# Patient Record
Sex: MALE | Race: OTHER
[De-identification: names, ages, dates, MRNs, and addresses within clinical notes are randomized per-mention and may not be internally consistent; named-entity substitution may affect disease eponyms.]

---

## 2017-06-29 ENCOUNTER — HOSPITAL ENCOUNTER (INPATIENT)
Dept: HOSPITAL 12 - SRC | Age: 27
LOS: 6 days | Discharge: HOME | DRG: 895 | End: 2017-07-05
Attending: INTERNAL MEDICINE | Admitting: INTERNAL MEDICINE
Payer: COMMERCIAL

## 2017-06-29 VITALS — WEIGHT: 160 LBS | HEIGHT: 68 IN | BODY MASS INDEX: 24.25 KG/M2

## 2017-06-29 VITALS — SYSTOLIC BLOOD PRESSURE: 119 MMHG | DIASTOLIC BLOOD PRESSURE: 68 MMHG

## 2017-06-29 VITALS — SYSTOLIC BLOOD PRESSURE: 116 MMHG | DIASTOLIC BLOOD PRESSURE: 57 MMHG

## 2017-06-29 VITALS — DIASTOLIC BLOOD PRESSURE: 47 MMHG | SYSTOLIC BLOOD PRESSURE: 100 MMHG

## 2017-06-29 DIAGNOSIS — F17.210: ICD-10-CM

## 2017-06-29 DIAGNOSIS — F11.23: Primary | ICD-10-CM

## 2017-06-29 DIAGNOSIS — F13.90: ICD-10-CM

## 2017-06-29 LAB
ALP SERPL-CCNC: 70 U/L (ref 50–136)
ALT SERPL W/O P-5'-P-CCNC: 20 U/L (ref 16–63)
AMPHETAMINES UR QL SCN>1000 NG/ML: NEGATIVE
AST SERPL-CCNC: 13 U/L (ref 15–37)
BARBITURATES UR QL SCN: NEGATIVE
BASOPHILS # BLD AUTO: 0.1 K/UL (ref 0–8)
BASOPHILS NFR BLD AUTO: 1.1 % (ref 0–2)
BILIRUB SERPL-MCNC: 0.3 MG/DL (ref 0.2–1)
BUN SERPL-MCNC: 9 MG/DL (ref 7–18)
CHLORIDE SERPL-SCNC: 106 MMOL/L (ref 98–107)
CO2 SERPL-SCNC: 26 MMOL/L (ref 21–32)
COCAINE UR QL SCN: NEGATIVE
CREAT SERPL-MCNC: 1 MG/DL (ref 0.6–1.3)
EOSINOPHIL # BLD AUTO: 0 K/UL (ref 0–0.7)
EOSINOPHIL NFR BLD AUTO: 0.2 % (ref 0–7)
ETHANOL SERPL-MCNC: < 3 MG/DL (ref 0–0)
GLUCOSE SERPL-MCNC: 97 MG/DL (ref 74–106)
HCT VFR BLD AUTO: 45.8 % (ref 40–50)
HGB BLD-MCNC: 15.6 G/DL (ref 14–18)
LYMPHOCYTES # BLD AUTO: 1.3 K/UL (ref 0.8–4.8)
LYMPHOCYTES NFR BLD AUTO: 14.4 % (ref 20.5–51.5)
MAGNESIUM SERPL-MCNC: 2.1 MG/DL (ref 1.8–2.4)
MCH RBC QN AUTO: 31.4 UUG (ref 27–31)
MCHC RBC AUTO-ENTMCNC: 34 G/DL (ref 32–37)
MCV RBC AUTO: 92.2 FL (ref 82–92)
MONOCYTES # BLD AUTO: 0.6 K/UL (ref 0.1–1.3)
MONOCYTES NFR BLD AUTO: 6.5 % (ref 0–11)
NEUTROPHILS # BLD AUTO: 7.2 K/UL (ref 1.8–8.9)
NEUTROPHILS NFR BLD AUTO: 77.8 % (ref 38.5–71.5)
OPIATES UR QL SCN: POSITIVE
PCP UR QL SCN>25 NG/ML: NEGATIVE
PLATELET # BLD AUTO: 278 K/UL (ref 150–450)
POTASSIUM SERPL-SCNC: 4.1 MMOL/L (ref 3.5–5.1)
RBC # BLD AUTO: 4.96 MIL/UL (ref 4.7–6.1)
THC UR QL SCN>50 NG/ML: NEGATIVE
WBC # BLD AUTO: 9.2 K/UL (ref 4–11.2)
WS STN SPEC: 7.8 G/DL (ref 6.4–8.2)

## 2017-06-29 PROCEDURE — HZ2ZZZZ DETOXIFICATION SERVICES FOR SUBSTANCE ABUSE TREATMENT: ICD-10-PCS | Performed by: INTERNAL MEDICINE

## 2017-06-29 PROCEDURE — G0480 DRUG TEST DEF 1-7 CLASSES: HCPCS

## 2017-06-29 RX ADMIN — HYDROXYZINE PAMOATE PRN MG: 25 CAPSULE ORAL at 15:58

## 2017-06-29 RX ADMIN — GABAPENTIN SCH MG: 300 CAPSULE ORAL at 20:58

## 2017-06-29 RX ADMIN — HYDROXYZINE PAMOATE PRN MG: 25 CAPSULE ORAL at 22:24

## 2017-06-29 NOTE — NUR
PRN ROBAXIN, IBUPROFEN AND VISTARIL



Patient complains of body aches 8/10 and anxiety, prn robaxin, ibuprofen and vistaril given. 
 Will continue to monitor patient. 

-------------------------------------------------------------------------------

Addendum: 06/29/17 at 1852 by TIFFANIE WINSTON RN

-------------------------------------------------------------------------------

this is note for 1558pm.

## 2017-06-29 NOTE — NUR
END OD SHIFT 



Patient is 26yo male admitted for supervised opiate withdrawal. Alert and oriented X4, full 
code, with NKA. Patient reports pain 8/10 body aches. Denies SOB and chest pain. Patient has 
steady gait.  Denies suicidal and homicidal ideation.  PRN robaxin, ibuprofen and vistaril 
given as well as one time gabapentin given. Last COW: 5, complains of anxiety and body 
aches.  Patient tolerated dinner and ate 100% without complaint of n/v. He went out for 
fresh air break with escort.

## 2017-06-29 NOTE — NUR
Start of Shift Note:

Patient is a 28 y/o male admitted on 06/29/17 for Opiate dependence. Patient reported using 
Norco or Roxycodone whichever is available. Patient was using 10 pills daily for 5 months. 
Last use was 2 days ago. Patient denies any past medical history. Patient is on a regular 
diet with no known food and drug allergies. Full Code status. Patient has no taper. PRN 
medications is available for symptoms of withdrawal. Last COWS is 5. Patient was given PRN 
Vistaril, Robaxin & Motrin during day shift. Patient is alert & oriented x4. No shortness of 
breath noted. Respiration even & unlabored. Abdomen soft & non-distended. No nausea/vomiting 
noted. Patient denies sweating, chills or diarrhea. No hand tremors noted. Patient with 
complains of 8/10 body aches & restlessness. Patient appears anxious. Encourage pt to 
increase fluid intake. Safety precautions are in place. Bed locked in lowest position. Both 
side rails up. Call light within pt's reach. Will continue to monitor patient.

## 2017-06-29 NOTE — NUR
PRN ROBAXIN, IBUPROFEN AND VISTARIL



Patient complains of body aches 8/10 and anxiety, prn robaxin, ibuprofen and vistaril given. 
 Will continue to monitor patient.

## 2017-06-29 NOTE — NUR
INTAKE ASSESSMENT



Patient is 28yo female patient presented for admission for supervised withdrawal from 
opiates and heroin. Patient states he was recently taking roxies or norco (whichever is 
available) and heroin.  Denies use of alcohol. Vital signs: 125/57, HR 84, R 18, 02 sat 97% 
RA, pain 8/10. Patient denies chest pain and SOB, complains of anxiety, restless arms and 
legs, body aches 8/10 and diarrhea. [facility] routines explained to patient (i.e q4h vital, 
medication handling including narcotics , disposal of any contraband. Patient has no home 
meds. Patient appears to be stable to proceed with his admission to [facility] 
for further care.

## 2017-06-29 NOTE — NUR
ADMISSION NOTE: 



Patient is 26yo male admitted for supervised opiate withdrawal. Alert and oriented X4, full 
code, with NKa. Patient reports pain 8/10 body aches. Denies SOB and chest pain, has 1 
pimple on his back. Patient has steady gait. Patient denies any medical histpry. No history 
of seizure. Patient received ibuprofen, vistaril and robaxin. Declined loperamide at this 
time but will notify nurse if persists. Patient complains of tingling pain on BUE and BLE 
especially on L arm, MD was notified, one time gabapentin 300mg was given. Denies suicidal 
and homicidal ideation. COW on admission = 10, complains of yawning, nausea, body aches, 
diarrhea. Patient has no PCP at this time.  



Subtance use history per patient report:



1) Roxies - pt. reports last use was 6/25/17, used 10 pills, for 5 months, started 3 years 
ago (pt. reports he would use roxies or norco whichever is available) 



2) Norco - pt. reports last use was 6/27/17, used 10 pills/day, for past 5 months, started 3 
years ago (pt. reports he would use roxies or norco whichever is available)   



3) Heroin - last use was 6/26/17, used 1-2 grams, for 3 weeks, started 1 month ago 

 



Rehab history: none

## 2017-06-29 NOTE — NUR
MD COMMUNICATION/TELEPHONE ORDER



Patient complains of tingling pain on BUE and BLE. Notified Dr. Nolen. Received telephone 
order for gabapentin 300mg TID and ONE time, order read back.

## 2017-06-29 NOTE — NUR
PRN Tylenol & Benadryl

Patient complains of 8/10 body aches and noted to be restless. Patient is moaning and with 
facial grimacing noted. Patient verbalized that he has trouble sleeping d/t pain. PRN 
Tylenol and Benadryl administered as ordered. Will monitor for effectiveness of medication.

## 2017-06-29 NOTE — NUR
One-time Robaxin order



Pt c/o generalized muscle pain=10/10 with episodes of crying. Called Dr. Nolen and he 
ordered one-time Robaxin 750 mg PO. Carried out by Primary Nurse.

## 2017-06-29 NOTE — NUR
Positive benzo results 

Pt states that his friend gave him a "sleeping pill", pt is unable to recall what it was 
called. Pt states that he took one pill the night prior on 6/28/17. Pt denies taking any 
other benzos.

## 2017-06-29 NOTE — NUR
REASSESSMENT PRN ROBAXIN, IBUPROFEN AND VISTARIL



Patient reports feeling less anxious.  Patient also reports the pain level has decreased to 
5/10 but still feels the tingling pain.

## 2017-06-29 NOTE — NUR
PRN Baclofen

Patient is so restless and anxious. Patient complains of 8/10 tingling pain & cramping all 
over body. PRN Baclofen administered as ordered. Will monitor for effectiveness of 
medication.

## 2017-06-29 NOTE — NUR
PRN Subutex

Patient presented with nausea with 1 episode of vomiting, runny nose, moderately dilated 
pupils, anxiet & sever body aches. Patient is pacing inside his room and unable to sit 
still. Vitals WNL. COWS is 17. PRN Subutex 4mg administered as ordered. Will monitor for 
effectiveness of medication.

-------------------------------------------------------------------------------

Addendum: 06/30/17 at 0243 by NAYELI FULTON RN

-------------------------------------------------------------------------------

Patient was also given PRN Trazodone d/t complains of not being able to sleep d/t pain. Will 
monitor for effectiveness of medication.

## 2017-06-30 VITALS — SYSTOLIC BLOOD PRESSURE: 137 MMHG | DIASTOLIC BLOOD PRESSURE: 81 MMHG

## 2017-06-30 VITALS — DIASTOLIC BLOOD PRESSURE: 62 MMHG | SYSTOLIC BLOOD PRESSURE: 126 MMHG

## 2017-06-30 VITALS — DIASTOLIC BLOOD PRESSURE: 60 MMHG | SYSTOLIC BLOOD PRESSURE: 95 MMHG

## 2017-06-30 VITALS — DIASTOLIC BLOOD PRESSURE: 63 MMHG | SYSTOLIC BLOOD PRESSURE: 123 MMHG

## 2017-06-30 VITALS — SYSTOLIC BLOOD PRESSURE: 116 MMHG | DIASTOLIC BLOOD PRESSURE: 88 MMHG

## 2017-06-30 VITALS — SYSTOLIC BLOOD PRESSURE: 127 MMHG | DIASTOLIC BLOOD PRESSURE: 70 MMHG

## 2017-06-30 PROCEDURE — HZ41ZZZ GROUP COUNSELING FOR SUBSTANCE ABUSE TREATMENT, BEHAVIORAL: ICD-10-PCS

## 2017-06-30 PROCEDURE — HZ31ZZZ INDIVIDUAL COUNSELING FOR SUBSTANCE ABUSE TREATMENT, BEHAVIORAL: ICD-10-PCS

## 2017-06-30 RX ADMIN — BUPRENORPHINE HYDROCHLORIDE SCH MG: 2 TABLET SUBLINGUAL at 16:35

## 2017-06-30 RX ADMIN — GABAPENTIN SCH MG: 300 CAPSULE ORAL at 20:54

## 2017-06-30 RX ADMIN — BACLOFEN PRN MG: 20 TABLET ORAL at 13:22

## 2017-06-30 RX ADMIN — GABAPENTIN SCH MG: 300 CAPSULE ORAL at 08:31

## 2017-06-30 RX ADMIN — THERA TABS SCH UDTAB: TAB at 08:31

## 2017-06-30 RX ADMIN — BUPRENORPHINE HYDROCHLORIDE SCH MG: 2 TABLET SUBLINGUAL at 20:54

## 2017-06-30 RX ADMIN — GABAPENTIN SCH MG: 300 CAPSULE ORAL at 14:39

## 2017-06-30 RX ADMIN — BUPRENORPHINE HYDROCHLORIDE SCH MG: 2 TABLET SUBLINGUAL at 13:22

## 2017-06-30 NOTE — NUR
PRN Trazodone

Patient requesting for medication to help him sleep. PRN Trazodone administered as ordered. 
Will continue to monitor patient.

## 2017-06-30 NOTE — NUR
START OF SHIFT & PRN Bentyl 20mg

vd endorsement from ongoing nurse, clients is in bed, he presents with anxious mood, flat 
affect, skin moist/warm to touch, enlarged pupils, he reports restless legs and stomach 
cramps. administered Bentyl 20 mg PO.  Encouraged client to drink fluid as tolerated to 
facilitate detox. Encourage client to attend group therapy for skills to maintain sobriety.  
PRN Tylenol, Robaxin, Neurontin & Baclofen administered for severe body aches, Subutex 4mg 
COWS 17, effective COWS 8. Trazodone and Benadryl for inability to sleep, he slept 5 hrs. 
Client has PRN Subutex to assist with withdrawal symptoms. NKDA, full code, regular diet. 
Call light within reach. Side rails x 2 up/padded. Will continue to monitor client.

## 2017-06-30 NOTE — NUR
PRN Reassessment

Patient verbalized improved nausea & slight relief from body aches. Patient still noted with 
a 6/10 pain, stomach cramps, runny nose. No hand tremors noted. COWS 8 noted at this time. 
Vitals WNL. Will continue to monitor patient.

## 2017-06-30 NOTE — NUR
Client was prompted to attend daily group therapy sessions at 11am and 3:30pm. Client stated 
that he would consider attending.

## 2017-06-30 NOTE — NUR
End of Shift Note:

Endorse pt to day shift nurse. Pt has been having severe body aches and tingling pain all 
over the body last night unrelieved by Tylenol, Robaxin, Neurontin & Baclofen that was given 
to him. Patient was then given PRN Subutex for increased withdrawal symptoms and a COWS of 
17 at 2303. Medication was effective in controlling her withdrawal symptoms as evidenced by 
the decreased in COWS scores from 17 to 8. Patient remained stable and Vitals remains WNL. 
Pt was also given Benadryl and Trazodone for sleep. Patient still asleep at this time with 
no s/s of distress. Pt slept for a total of 5 hours. Pt consumed 763ml of fluids. Voided 1x 
with no bowel movement. All needs attended & met. Safety measures in place. Will continue to 
monitor patient.

## 2017-06-30 NOTE — NUR
Start of Shift Note:

Patient is a 26 y/o male admitted on 06/29/17 for Opiate dependence. Patient reported using 
Norco or Roxycodone whichever is available. Patient was using 10 pills daily for 5 months. 
Last use was 2 days ago. Patient denies any past medical history. Patient is on a regular 
diet with no known food and drug allergies. Full Code status. Patient is placed on a 5-day 
Subutex taper and tolerating well. Last COWS is 8. Patient was given PRN Bentyl during day 
shift. Patient refused PPD and CXR was done with unremarkable result. Patient is alert & 
oriented x4. No shortness of breath noted. Respiration even & unlabored. Abdomen soft & 
non-distended. No nausea/vomiting noted. Patient complains of 3/10 body aches & stomach 
cramps. Patient denies sweating, chills , diarrhea and runny nose at this time. No hand 
tremors noted. Patient appears calm and comfortable in bed. Patient denies SI/HI. Safety 
precautions are in place. Bed locked in lowest position. Both side rails up. Call light 
within pt's reach. Will continue to monitor patient.

## 2017-07-01 VITALS — DIASTOLIC BLOOD PRESSURE: 76 MMHG | SYSTOLIC BLOOD PRESSURE: 121 MMHG

## 2017-07-01 VITALS — SYSTOLIC BLOOD PRESSURE: 119 MMHG | DIASTOLIC BLOOD PRESSURE: 69 MMHG

## 2017-07-01 VITALS — SYSTOLIC BLOOD PRESSURE: 129 MMHG | DIASTOLIC BLOOD PRESSURE: 73 MMHG

## 2017-07-01 VITALS — DIASTOLIC BLOOD PRESSURE: 56 MMHG | SYSTOLIC BLOOD PRESSURE: 108 MMHG

## 2017-07-01 VITALS — SYSTOLIC BLOOD PRESSURE: 120 MMHG | DIASTOLIC BLOOD PRESSURE: 69 MMHG

## 2017-07-01 VITALS — SYSTOLIC BLOOD PRESSURE: 114 MMHG | DIASTOLIC BLOOD PRESSURE: 64 MMHG

## 2017-07-01 RX ADMIN — BUPRENORPHINE HYDROCHLORIDE SCH MG: 2 TABLET SUBLINGUAL at 08:44

## 2017-07-01 RX ADMIN — DICYCLOMINE HYDROCHLORIDE SCH MG: 20 TABLET ORAL at 20:39

## 2017-07-01 RX ADMIN — THERA TABS SCH UDTAB: TAB at 08:43

## 2017-07-01 RX ADMIN — CLONIDINE HYDROCHLORIDE SCH MG: 0.1 TABLET ORAL at 20:38

## 2017-07-01 RX ADMIN — GABAPENTIN SCH MG: 300 CAPSULE ORAL at 14:05

## 2017-07-01 RX ADMIN — BUPRENORPHINE HYDROCHLORIDE SCH MG: 2 TABLET SUBLINGUAL at 14:06

## 2017-07-01 RX ADMIN — GABAPENTIN SCH MG: 300 CAPSULE ORAL at 08:43

## 2017-07-01 RX ADMIN — GABAPENTIN SCH MG: 300 CAPSULE ORAL at 20:38

## 2017-07-01 RX ADMIN — BACLOFEN SCH MG: 10 TABLET ORAL at 14:05

## 2017-07-01 RX ADMIN — BACLOFEN PRN MG: 20 TABLET ORAL at 08:43

## 2017-07-01 RX ADMIN — DICYCLOMINE HYDROCHLORIDE SCH MG: 20 TABLET ORAL at 14:05

## 2017-07-01 RX ADMIN — BACLOFEN SCH MG: 10 TABLET ORAL at 20:38

## 2017-07-01 RX ADMIN — BUPRENORPHINE HYDROCHLORIDE SCH MG: 2 TABLET SUBLINGUAL at 20:39

## 2017-07-01 NOTE — NUR
Client was prompted by therapist to attend daily group therapy sessions. Client stated he 
would attend group today.

## 2017-07-01 NOTE — NUR
Start of Shift 

Pt is a 27 year old male admitted for Opiate dependence, placed on 5 day Subutex taper. 
Roxicodone 10 pill/daily or Norco 10 pills/daily (whichever is available) and Heroin  
1-2grams x3 weeks. Pt denies and PMH. NKA, regular diet, fall precautions and full code. 
Upon assessment, pt reports hot/cold chills and aches throughout body, pt reports mild 
anxiety, skin flushed, respirations even/unlabored, denies SOB/chest pain, denies n/v/d. 
Safety measures in place, call light within reach, side rails up x2, bed locked and in low 
position. Will continue to monitor.

## 2017-07-01 NOTE — NUR
PRN Baclofen 20mg for muscle spasm on lower back, risk/benefits discuss, he verbalized 
understanding. Call light within reach. Will continue to monitor.

## 2017-07-01 NOTE — NUR
END OF SHIFT

Will endorse client to incoming nurse, client continues on Subutex taper day 2nd of 5, to 
alleviate withdrawal symptoms of withdrawal from opioids.  Last COWS 4. Client with anxiety, 
irritability, chills, fatigue, flushed face, muscle spasms and abdominal cramps. Client 
continue to present with depressed mood, flat affect. Client was compliant with 2/3 of group 
therapy. Client is fully ambulatory. Adequate intake 2658mL, void x 4. Call light within 
reach. Safety measures rendered and all needs met.

## 2017-07-01 NOTE — NUR
Reassessment PRN Baclofen 20mg effective, client verbalizes relief from muscle spasm on 
lower back. Call light within reach.

## 2017-07-01 NOTE — NUR
End of Shift Note:

Patient had an uneventful night. Patient is on a Subutex taper and tolerating well. Patient 
remained compliant with medications. Last COWS is 3. Pt was given PRN Trazodone for sleep 
during my shift. Patient reported that taper medications is effective in controlling his 
withdrawal symptoms. Patient remained stable and Vitals remains WNL. Will continue to 
encourage pt to attend groups to learn and develop coping skills to prevent relapse. Pt 
still asleep at this time with no s/s of distress noted. Pt was able to sleep for a total of 
5 hours. Consumed 1693 ml of fluids. Voided 2x with 1x bowel movement. All needs attended 
met. Safety measures in place. Will endorse pt to day shift nurse.

## 2017-07-01 NOTE — NUR
START OF SHIFT

Rcvd endorsement from ongoing nurse, clients is in bed, he presents with depressed mood, 
flat affect, skin moist/warm to touch, enlarged pupils, he stated "I feel tired, couldn't  
sleep last night because of tingling on my feet and arms." He reports restless legs and 
abdominal cramps, denies any N/V/D.  Encouraged client to drink fluid as tolerated to 
facilitate detox. Encourage client to attend group therapy for skills to maintain sobriety.  
PRN Trazodone for inability to sleep, he slept 5 hrs. Client is on 2nd of 5 day Subutex 
taper, tolerating well.  NKDA, full code, regular diet. Call light within reach. Side rails 
x 2 up. Will continue to monitor client.

## 2017-07-01 NOTE — NUR
Ciwa deferred

Patient asleep at this time. Unable to assess for CIWA. Pt is not in any distress. Will 
continue to monitor patient.

-------------------------------------------------------------------------------

Addendum: 07/01/17 at 0531 by NAYELI FULTON RN

-------------------------------------------------------------------------------

COWS deferred not CIWA

## 2017-07-01 NOTE — NUR
Cows deferred

Patient asleep at this time. Unable to assess for CIWA. Pt is not in any distress. Will 
continue to monitor patient.

## 2017-07-01 NOTE — NUR
PRN Reassessment

Patient asleep at this time and appears comfortable. No s/s of distress noted. Safety 
measures in place. Will continue to monitor patient.

## 2017-07-02 VITALS — SYSTOLIC BLOOD PRESSURE: 127 MMHG | DIASTOLIC BLOOD PRESSURE: 67 MMHG

## 2017-07-02 VITALS — DIASTOLIC BLOOD PRESSURE: 67 MMHG | SYSTOLIC BLOOD PRESSURE: 128 MMHG

## 2017-07-02 VITALS — DIASTOLIC BLOOD PRESSURE: 66 MMHG | SYSTOLIC BLOOD PRESSURE: 111 MMHG

## 2017-07-02 VITALS — SYSTOLIC BLOOD PRESSURE: 116 MMHG | DIASTOLIC BLOOD PRESSURE: 70 MMHG

## 2017-07-02 VITALS — SYSTOLIC BLOOD PRESSURE: 124 MMHG | DIASTOLIC BLOOD PRESSURE: 77 MMHG

## 2017-07-02 RX ADMIN — BACLOFEN SCH MG: 10 TABLET ORAL at 15:15

## 2017-07-02 RX ADMIN — DICYCLOMINE HYDROCHLORIDE SCH MG: 20 TABLET ORAL at 15:15

## 2017-07-02 RX ADMIN — GABAPENTIN SCH MG: 300 CAPSULE ORAL at 10:22

## 2017-07-02 RX ADMIN — DICYCLOMINE HYDROCHLORIDE SCH MG: 20 TABLET ORAL at 10:24

## 2017-07-02 RX ADMIN — GABAPENTIN SCH MG: 300 CAPSULE ORAL at 20:43

## 2017-07-02 RX ADMIN — BUPRENORPHINE HYDROCHLORIDE SCH MG: 2 TABLET SUBLINGUAL at 15:15

## 2017-07-02 RX ADMIN — BACLOFEN SCH MG: 10 TABLET ORAL at 10:24

## 2017-07-02 RX ADMIN — THERA TABS SCH UDTAB: TAB at 10:22

## 2017-07-02 RX ADMIN — GABAPENTIN SCH MG: 300 CAPSULE ORAL at 15:15

## 2017-07-02 RX ADMIN — BACLOFEN SCH MG: 10 TABLET ORAL at 20:42

## 2017-07-02 RX ADMIN — DICYCLOMINE HYDROCHLORIDE SCH MG: 20 TABLET ORAL at 20:43

## 2017-07-02 RX ADMIN — CLONIDINE HYDROCHLORIDE SCH MG: 0.1 TABLET ORAL at 10:24

## 2017-07-02 RX ADMIN — BUPRENORPHINE HYDROCHLORIDE SCH MG: 2 TABLET SUBLINGUAL at 20:43

## 2017-07-02 RX ADMIN — CLONIDINE HYDROCHLORIDE SCH MG: 0.1 TABLET ORAL at 20:44

## 2017-07-02 NOTE — NUR
END OF SHIFT

Will endorse client to incoming nurse, client continues on Subutex taper day 3rd of 5, to 
alleviate withdrawal symptoms from opioids such as anxiety, irritability, chills, fatigue, 
flushed face, muscle spasms and abdominal cramps. Last COWS 3. Client continue to present 
with depressed mood, flat affect. Client was compliant with group therapy. Client is fully 
ambulatory. Adequate intake 2000mL, void x 2, stool x1. Call light within reach. Safety 
measures rendered and all needs met.

## 2017-07-02 NOTE — NUR
End of Shift 

Pt is a 27 year old male admitted for Opiate dependence, placed on 5 day Subutex taper. 
Roxicodone 10 pill/daily or Norco 10 pills/daily (whichever is available) and Heroin  
1-2grams x3 weeks. Pt denies and PMH. NKA, regular diet, fall precautions and full code. 
During shift, pt reported hot/cold chills and aches throughout body, pt reports mild 
anxiety, skin flushed - scheduled taper medications administered, effective in management of 
s/s of withdrawal, as reports per pt aeb a decrease in COWS 5 to COWS 4. No PRN medications 
administered during shift. pt slept for 4 hours, intake of 2041 ml PO, voids x3 and stool 
x0.  Safety measures in place, call light within reach, side rails up x2, bed locked and in 
low position. Endorsed to day shift nurse.

## 2017-07-02 NOTE — NUR
START OF SHIFT--

Pt is a 27 year old male admitted for Opiate dependence, placed on 5 day Subutex taper. Pt 
denies and PMH. NKA, regular diet,on  fall precautions and full code.Pt continues on Subutex 
taper ,day 3 out of 5. Last COWS= 3. Pt  is fully ambulatory. A/O X 4. All  Safety measures 
in place, call light within reach, will continue to monitor.

## 2017-07-02 NOTE — NUR
Pt refused to be woken up for 0400 VS

COWS deferred d/t pt sleeping - to assess while pt is awake as ordered. 

Safety measures in place. Will continue to monitor

## 2017-07-02 NOTE — NUR
START OF SHIFT

Rcvd endorsement from ongoing nurse, clients is in bed, he is a/o x 4, he stated "I can't 
believe, I was able to sleep without any medication." He presents with depressed mood, flat 
affect, skin moist/warm to touch, he denies any N/V/D. Encouraged client to drink fluid as 
tolerated to facilitate detox. Encourage client to attend group therapy for skills to 
maintain sobriety. Client had an uneventful night, he slept 6 hrs. Client is on 3rd of 5 day 
Subutex taper, tolerating well. Last COWS 3 @ 2000. NKDA, full code, regular diet. Call 
light within reach. Side rails x 2 up. Will continue to monitor client.

## 2017-07-02 NOTE — NUR
Parents of Ramos came to inquire about his current condition. Writer spoke to Mom and inform 
her that Ramos is compliant with treatment plan, joins activities and has conversations with 
his peers. Ramos is tolerating taper medications. Writer suggest Mom to encourage NA meetings 
when Ramos is at home. Mom verbalized understanding.

## 2017-07-03 VITALS — DIASTOLIC BLOOD PRESSURE: 65 MMHG | SYSTOLIC BLOOD PRESSURE: 110 MMHG

## 2017-07-03 VITALS — SYSTOLIC BLOOD PRESSURE: 112 MMHG | DIASTOLIC BLOOD PRESSURE: 76 MMHG

## 2017-07-03 VITALS — DIASTOLIC BLOOD PRESSURE: 67 MMHG | SYSTOLIC BLOOD PRESSURE: 116 MMHG

## 2017-07-03 VITALS — DIASTOLIC BLOOD PRESSURE: 71 MMHG | SYSTOLIC BLOOD PRESSURE: 124 MMHG

## 2017-07-03 VITALS — DIASTOLIC BLOOD PRESSURE: 57 MMHG | SYSTOLIC BLOOD PRESSURE: 127 MMHG

## 2017-07-03 RX ADMIN — DICYCLOMINE HYDROCHLORIDE SCH MG: 20 TABLET ORAL at 08:55

## 2017-07-03 RX ADMIN — GABAPENTIN SCH MG: 300 CAPSULE ORAL at 08:55

## 2017-07-03 RX ADMIN — THERA TABS SCH UDTAB: TAB at 08:55

## 2017-07-03 RX ADMIN — BUPRENORPHINE HYDROCHLORIDE SCH MG: 2 TABLET SUBLINGUAL at 21:21

## 2017-07-03 RX ADMIN — BACLOFEN SCH MG: 20 TABLET ORAL at 21:22

## 2017-07-03 RX ADMIN — BUPRENORPHINE HYDROCHLORIDE SCH MG: 2 TABLET SUBLINGUAL at 15:17

## 2017-07-03 RX ADMIN — BACLOFEN SCH MG: 10 TABLET ORAL at 08:55

## 2017-07-03 RX ADMIN — BUPRENORPHINE HYDROCHLORIDE SCH MG: 2 TABLET SUBLINGUAL at 08:55

## 2017-07-03 RX ADMIN — CLONIDINE HYDROCHLORIDE SCH MG: 0.1 TABLET ORAL at 21:22

## 2017-07-03 RX ADMIN — GABAPENTIN SCH MG: 300 CAPSULE ORAL at 21:20

## 2017-07-03 RX ADMIN — DICYCLOMINE HYDROCHLORIDE SCH MG: 20 TABLET ORAL at 15:17

## 2017-07-03 RX ADMIN — DICYCLOMINE HYDROCHLORIDE SCH MG: 20 TABLET ORAL at 21:22

## 2017-07-03 RX ADMIN — BACLOFEN SCH MG: 20 TABLET ORAL at 15:17

## 2017-07-03 RX ADMIN — GABAPENTIN SCH MG: 300 CAPSULE ORAL at 15:17

## 2017-07-03 RX ADMIN — CLONIDINE HYDROCHLORIDE SCH MG: 0.1 TABLET ORAL at 08:55

## 2017-07-03 NOTE — NUR
Pt refused to be woken up for 0400 VS

COWS deferred d/t pt sleeping.

Safety measures in place. Will continue to monitor.

## 2017-07-03 NOTE — NUR
END OF SHIFT

Pt is a 27 year old male admitted for Opiate dependence, placed on 5 day Subutex taper. Pt 
denies and PMH. NKA, regular diet,on  fall precautions and full code.Pt continues on Subutex 
taper ,day 4 out of 5. Last COWS= 2.No c/o pain or distress noted.No PRN  meds given last 
night,pt slept without any problem, slept 6 hrs ; fluid intake was 1550 mls; voided x 2.Pt  
is fully ambulatory. A/O X 4. All  Safety measures in place, call light within reach, will 
continue to monitor.

## 2017-07-03 NOTE — NUR
START OF SHIFT--

Pt is a 27 year old male admitted for Opiate dependence, placed on 5 day Subutex taper. Pt 
denies and PMH. NKA, regular diet ,on  fall precautions and full code. Pt continues on 
Subutex taper  as ordered. Last COWS= 3. Pt  is fully ambulatory. A/O X 4. No PRN  meds 
given on day shift. All  Safety measures in place, call light within reach, will continue to 
monitor.

## 2017-07-03 NOTE — NUR
Start of Shift

Report from the [facility] nurse: pt is a 28 y/o male, NKA, here for Opiate dependence r/t Norco 
10 tabs PO/d, oxycodone 10 tabs PO/d both for 5 months and Heroin smoked 1-2g/ for 3 weeks;  
5 day Subutex taper ordered. Pt is a full code, regular diet, fall precautions ordered.  
Hhx: smoker and first time doing detox. No PRN medications given last night. V/S stable 
except  last night before the scheduled Clonidine given and now stable. Skin is 
intact. Pt had a CXR 6/30/17 WNL's.  No abnormal labs or new orders endorsed to me. Last 
COWS 2. Pt is asleep in room. Will cont. to monitor the pt.

## 2017-07-03 NOTE — NUR
End of Shift

Report to the [facility] nurse with update: pt is a 28 y/o male, NKA, here for Opiate dependence 
r/t Norco 10 tabs PO/d, oxycodone 10 tabs PO/d both for 5 months and Heroin smoked 1-2g/d 
for 3 weeks;  5 day Subutex taper ordered. Pt is a full code, regular diet, fall precautions 
ordered.  Hhx: Appendectomy, smoker and first time doing detox. No PRN medications given 
during my shift. Features symmetrical, PERRLA 3mm, no HA or dizziness during my shift. Pt 
denies chest pain. No SOB or acute respiratory distress during my shift. Pt denies N/V/D, 
and one BM during my shift. Pt denies dysuria. V/S stable. Skin is intact. No 
hallucinations, delusions or suicidal ideations noted. Pt participated in the group therapy 
and activities. Pt had a CXR 6/30/17 WNL's. No new orders of labs during my shift. Last COWS 
3.

## 2017-07-04 VITALS — DIASTOLIC BLOOD PRESSURE: 73 MMHG | SYSTOLIC BLOOD PRESSURE: 109 MMHG

## 2017-07-04 VITALS — SYSTOLIC BLOOD PRESSURE: 102 MMHG | DIASTOLIC BLOOD PRESSURE: 62 MMHG

## 2017-07-04 VITALS — SYSTOLIC BLOOD PRESSURE: 125 MMHG | DIASTOLIC BLOOD PRESSURE: 74 MMHG

## 2017-07-04 VITALS — DIASTOLIC BLOOD PRESSURE: 69 MMHG | SYSTOLIC BLOOD PRESSURE: 107 MMHG

## 2017-07-04 VITALS — DIASTOLIC BLOOD PRESSURE: 82 MMHG | SYSTOLIC BLOOD PRESSURE: 115 MMHG

## 2017-07-04 LAB
AMPHETAMINES UR QL SCN>1000 NG/ML: NEGATIVE
BARBITURATES UR QL SCN: NEGATIVE
COCAINE UR QL SCN: NEGATIVE
OPIATES UR QL SCN: NEGATIVE
PCP UR QL SCN>25 NG/ML: NEGATIVE
THC UR QL SCN>50 NG/ML: NEGATIVE

## 2017-07-04 RX ADMIN — THERA TABS SCH UDTAB: TAB at 08:53

## 2017-07-04 RX ADMIN — CLONIDINE HYDROCHLORIDE SCH MG: 0.1 TABLET ORAL at 08:53

## 2017-07-04 RX ADMIN — BACLOFEN SCH MG: 20 TABLET ORAL at 08:53

## 2017-07-04 RX ADMIN — BACLOFEN SCH MG: 20 TABLET ORAL at 14:44

## 2017-07-04 RX ADMIN — GABAPENTIN SCH MG: 300 CAPSULE ORAL at 08:52

## 2017-07-04 RX ADMIN — DICYCLOMINE HYDROCHLORIDE SCH MG: 20 TABLET ORAL at 21:06

## 2017-07-04 RX ADMIN — DICYCLOMINE HYDROCHLORIDE SCH MG: 20 TABLET ORAL at 08:52

## 2017-07-04 RX ADMIN — BACLOFEN SCH MG: 20 TABLET ORAL at 21:06

## 2017-07-04 RX ADMIN — DICYCLOMINE HYDROCHLORIDE SCH MG: 20 TABLET ORAL at 14:44

## 2017-07-04 RX ADMIN — GABAPENTIN SCH MG: 300 CAPSULE ORAL at 14:44

## 2017-07-04 RX ADMIN — CLONIDINE HYDROCHLORIDE SCH MG: 0.1 TABLET ORAL at 21:06

## 2017-07-04 RX ADMIN — GABAPENTIN SCH MG: 300 CAPSULE ORAL at 21:05

## 2017-07-04 NOTE — NUR
End of Shift

Report to the night nurse with update: pt is a 28 y/o male, NKA, here for Opiate dependence 
r/t [place] 10/325 tabs PO/d, oxycodone 10 tabs PO/d both for 5 months and Heroin smoked 
1-2g/d for 3 weeks;  5 day Subutex taper ordered. Pt is a full code, regular diet, fall 
precautions ordered.  Hhx: Appendectomy, smoker and first time doing detox. No PRN 
medications given during my shift. Features symmetrical, PERRLA 3mm, no HA or dizziness 
during my shift. Pt denies chest pain. No SOB or acute respiratory distress during my shift. 
Pt denies N/V/D, and one BM during my shift. Pt denies dysuria. V/S stable. Skin is intact. 
No hallucinations, delusions or suicidal ideations noted. New Orders for the pt to be d/c'd 
tomorrow so endorsed night nurse to f/u and print the results. Pt participated in the group 
therapy and activities. No new orders of labs during my shift. Last COWS 1

## 2017-07-04 NOTE — NUR
Start of Shift

Report from the [facility] nurse with update: pt is a 26 y/o male, NKA, here for Opiate dependence 
r/t Norco 10 tabs PO/d, oxycodone 10 tabs PO/d both for 5 months and Heroin smoked 1-2g/ for 
3 weeks;  5 day Subutex taper ordered. Pt is a full code, regular diet, fall precautions 
ordered.  Hhx: smoker and first time doing detox. No PRN medications given last night. V/S 
stable. Skin is intact. Pt had a CXR 6/30/17 WNL's.  No abnormal labs or new orders endorsed 
to me. Last COWS 2. Pt is asleep in room. Will cont. to monitor the pt.

## 2017-07-04 NOTE — NUR
END OF SHIFT

Pt is a 27 year old male admitted for Opiate dependence, placed on 5 day Subutex taper. Pt 
denies and PMH. NKA, regular diet ,on  fall precautions and full code. Pt continues on 
Subutex taper  as ordered. Last COWS= 2. Pt  is fully ambulatory. A/O X 4. No PRN  meds 
given.Pt slept  6  hrs,fluid intake was  1000    mls,voided x  2 . All  Safety measures in 
place, call light within reach, will continue to monitor.

## 2017-07-04 NOTE — NUR
V/S AND COWS

Pt is in deep sleep.

COWS deferred d/t pt sleeping.

Safety measures in place. Will continue to monitor.

## 2017-07-04 NOTE — NUR
START OF SHIFT NOTE;

Patient endorsed by day shift nurse.  Report received.  Patient is 27 year old male admitted 
to [facility] on 06/29/2017 under Doctor Felix Nolen MD care for opioid 
dependence, completed 5 day Subutex taper on 07/04/2017 without side effects, and tolerated 
well.  Patient reports NKA, is on Regular Diet, is on Full Code,  is on Fall Precautions.  
Patient denies History of Seizures.  PMH: Anxiety, Depression,  Substance abuse.  Patient 
reports substance abuse history: 

Roxies PO: " if available, 10 pills every day since February, 2017.  Last used  10 pills PO 
on 06/25/2017".

Norco PO: "if available, 10 pills every day since February, 2017.  Last used 10 pills PO on 
06/27/2017".

Heroine: "smoke 1-2 grams every day last three weeks.  Last smoked 1-2 grams on 06/26/2017".

Patient denies past "detox treatments".  Upon endorsement, patient is ambulatory with steady 
gate, stable, AOx4, cooperative,  speech is soft and clear.  COWS 2.  Patient c/o increased 
anxiety and sweating.  Respirations unlabored and even.  Lungs Sounds are clear bilaterally. 
 Bowel Sounds active in all x4 quadrants.  Last Bowel Movement was "7/4/17 at 1100".  Skin 
is intact, warm and dry to touch.  Encouraged fluids as tolerated.  Patient attended groups 
activities.  Patient scheduled for discharging tomorrow, on 04/05/2017.  UDS test results 
placed in chart.  Safety measures on place.  Call light within reach, bed in lowest position 
and locked,  padded rails up bilaterally rails up bilaterally.  Will continue to monitor 
closely.


-------------------------------------------------------------------------------

Addendum: 07/04/17 at 2226 by ANDREW FLOOD RN

-------------------------------------------------------------------------------

START OF SHIFT NOTE:



Patient has one small pimple in the back, open to air.

## 2017-07-05 VITALS — SYSTOLIC BLOOD PRESSURE: 114 MMHG | DIASTOLIC BLOOD PRESSURE: 74 MMHG

## 2017-07-05 VITALS — SYSTOLIC BLOOD PRESSURE: 113 MMHG | DIASTOLIC BLOOD PRESSURE: 64 MMHG

## 2017-07-05 RX ADMIN — GABAPENTIN SCH MG: 300 CAPSULE ORAL at 08:24

## 2017-07-05 RX ADMIN — DICYCLOMINE HYDROCHLORIDE SCH MG: 20 TABLET ORAL at 08:25

## 2017-07-05 RX ADMIN — CLONIDINE HYDROCHLORIDE SCH MG: 0.1 TABLET ORAL at 08:25

## 2017-07-05 RX ADMIN — THERA TABS SCH UDTAB: TAB at 08:25

## 2017-07-05 RX ADMIN — BACLOFEN SCH MG: 20 TABLET ORAL at 08:25

## 2017-07-05 NOTE — NUR
Start of shift notes;



Patient is AOX4. Patient is a 27 year old male admitted on 6/29/17 for Opiate dependence. 
Patient was placed on 5 day Subutex taper, completed taper without any adverse reactions. 
Patient is on a regular diet, full code status, NKA. Patient is medically cleared for 
discharge today.All safety measures secured. Will continue to monitor patient.

## 2017-07-05 NOTE — NUR
Discharge note;



Patient is AOX4. Patient left the hospital at exactly 1005 on 7/5/17. All valuables, 
belongings and prescriptions given to patient. Patient left in a stable condition. Patient 
completed taper without any adverse reactions. Met all needs.

## 2017-07-05 NOTE — NUR
END OF SHIFT NOTE:

Patient endorsed to day shift nurse.  Report given.  Patient is 27 year old male admitted to 
[facility] on 06/29/2017 under Doctor Felxi Nolen MD care for opioid 
dependence, completed 5 day Subutex taper on 07/04/2017 without side effects, and tolerated 
well.  Patient reports NKA, is on Regular Diet, is on Full Code,  is on Fall Precautions.  
Patient denies History of Seizures.  PMH:Anxiety,Depression,Substance abuse.  Patient 
reports substance abuse history: 

Roxies PO: " if available, 10 pills every day since February, 2017.  Last used  10 pills PO 
on 06/25/2017".

Norco PO: "if available, 10 pills every day since February, 2017.  Last used 10 pills PO on 
06/27/2017".

Heroine: "smoke 1-2 grams every day last three weeks.  Last smoked 1-2 grams on 06/26/2017".

Patient denies past "detox treatments".  Upon last assessment at 0000 COWS 4.  Patient 
presented  with mild anxiety and  barely sweating.  Patient refused  to  be woken up for 
04:00 VS. COWS deferred d/t patient sleeping to assess while patient is awake.  Respirations 
unlabored and even. Patient has one small pimple in the back, open to air.  Skin is warm and 
dry to touch.  Encouraged fluids as tolerated.  Patient attended groups activities.  Patient 
scheduled for discharging today  on 04/05/2017.  UDS test results placed in chart.  Patient 
slept 5 hours, intake 1,385 ml, voided x3, stool x1.  Safety measures on place.  Call light 
within reach, bed in lowest position and locked,  padded rails up bilaterally rails up 
bilaterally.  Will continue to monitor closely.

## 2017-07-05 NOTE — NUR
VS REFUSED AND COWS  DEFERRED

Patient refused  to  be woken up for 04:00 VS. COWS deferred d/t patient sleeping to assess 
while patient is awake. Safety measures on place by hospital policy: Call light within 
reach; Bed in lowest position and locked; side rails up x2. Will continue to monitor.

## 2018-04-20 ENCOUNTER — HOSPITAL ENCOUNTER (INPATIENT)
Dept: HOSPITAL 12 - SRC | Age: 28
LOS: 6 days | Discharge: OTHER INSTITUTION | DRG: 895 | End: 2018-04-26
Attending: INTERNAL MEDICINE | Admitting: INTERNAL MEDICINE
Payer: COMMERCIAL

## 2018-04-20 VITALS — HEIGHT: 67 IN | WEIGHT: 154 LBS | BODY MASS INDEX: 24.17 KG/M2

## 2018-04-20 VITALS — DIASTOLIC BLOOD PRESSURE: 73 MMHG | SYSTOLIC BLOOD PRESSURE: 120 MMHG

## 2018-04-20 VITALS — SYSTOLIC BLOOD PRESSURE: 111 MMHG | DIASTOLIC BLOOD PRESSURE: 48 MMHG

## 2018-04-20 VITALS — SYSTOLIC BLOOD PRESSURE: 144 MMHG | DIASTOLIC BLOOD PRESSURE: 77 MMHG

## 2018-04-20 DIAGNOSIS — I15.9: ICD-10-CM

## 2018-04-20 DIAGNOSIS — F41.1: ICD-10-CM

## 2018-04-20 DIAGNOSIS — F13.230: ICD-10-CM

## 2018-04-20 DIAGNOSIS — G47.00: ICD-10-CM

## 2018-04-20 DIAGNOSIS — F11.23: Primary | ICD-10-CM

## 2018-04-20 DIAGNOSIS — F17.210: ICD-10-CM

## 2018-04-20 LAB
ALP SERPL-CCNC: 68 U/L (ref 50–136)
ALT SERPL W/O P-5'-P-CCNC: 22 U/L (ref 16–63)
AMPHETAMINES UR QL SCN>1000 NG/ML: NEGATIVE
AST SERPL-CCNC: 11 U/L (ref 15–37)
BARBITURATES UR QL SCN: NEGATIVE
BASOPHILS # BLD AUTO: 0 K/UL (ref 0–8)
BASOPHILS NFR BLD AUTO: 0.2 % (ref 0–2)
BILIRUB SERPL-MCNC: 0.3 MG/DL (ref 0.2–1)
BUN SERPL-MCNC: 15 MG/DL (ref 7–18)
CHLORIDE SERPL-SCNC: 103 MMOL/L (ref 98–107)
CO2 SERPL-SCNC: 29 MMOL/L (ref 21–32)
COCAINE UR QL SCN: NEGATIVE
CREAT SERPL-MCNC: 0.9 MG/DL (ref 0.6–1.3)
EOSINOPHIL # BLD AUTO: 0.1 K/UL (ref 0–0.7)
EOSINOPHIL NFR BLD AUTO: 1.9 % (ref 0–7)
ETHANOL SERPL-MCNC: < 3 MG/DL (ref 0–0)
GLUCOSE SERPL-MCNC: 95 MG/DL (ref 74–106)
HCT VFR BLD AUTO: 43.3 % (ref 36.7–47.1)
HGB BLD-MCNC: 14.9 G/DL (ref 12.5–16.3)
LYMPHOCYTES # BLD AUTO: 1.5 K/UL (ref 20–40)
LYMPHOCYTES NFR BLD AUTO: 21.8 % (ref 20.5–51.5)
MAGNESIUM SERPL-MCNC: 1.9 MG/DL (ref 1.8–2.4)
MCH RBC QN AUTO: 32.3 UUG (ref 23.8–33.4)
MCHC RBC AUTO-ENTMCNC: 35 G/DL (ref 32.5–36.3)
MCV RBC AUTO: 93.6 FL (ref 73–96.2)
MONOCYTES # BLD AUTO: 0.5 K/UL (ref 2–10)
MONOCYTES NFR BLD AUTO: 7.5 % (ref 0–11)
NEUTROPHILS # BLD AUTO: 4.8 K/UL (ref 1.8–8.9)
NEUTROPHILS NFR BLD AUTO: 68.6 % (ref 38.5–71.5)
OPIATES UR QL SCN: POSITIVE
PCP UR QL SCN>25 NG/ML: NEGATIVE
PLATELET # BLD AUTO: 231 K/UL (ref 152–348)
POTASSIUM SERPL-SCNC: 4.2 MMOL/L (ref 3.5–5.1)
RBC # BLD AUTO: 4.62 MIL/UL (ref 4.06–5.63)
THC UR QL SCN>50 NG/ML: NEGATIVE
WBC # BLD AUTO: 6.9 K/UL (ref 3.6–10.2)
WS STN SPEC: 7.4 G/DL (ref 6.4–8.2)

## 2018-04-20 PROCEDURE — G0480 DRUG TEST DEF 1-7 CLASSES: HCPCS

## 2018-04-20 PROCEDURE — HZ2ZZZZ DETOXIFICATION SERVICES FOR SUBSTANCE ABUSE TREATMENT: ICD-10-PCS | Performed by: INTERNAL MEDICINE

## 2018-04-20 RX ADMIN — IBUPROFEN PRN MG: 600 TABLET, FILM COATED ORAL at 21:18

## 2018-04-20 RX ADMIN — METHOCARBAMOL TABLETS PRN MG: 750 TABLET, COATED ORAL at 17:22

## 2018-04-20 RX ADMIN — GABAPENTIN SCH MG: 300 CAPSULE ORAL at 21:18

## 2018-04-20 NOTE — NUR
PRN MOTRIN



Patient reports back pain and left leg aches 6/10 on pain scale. PRN Motrin given PO. Safety 
measures in place, call light within reach. Will continue to monitor.

## 2018-04-20 NOTE — NUR
Reassessed pt. 1 hr. after giving PRN Ativan and Robaxin. Pt. states that the body aches and 
spasms have been alleviated and "I'm not twitching anymore". CIWA 9.

## 2018-04-20 NOTE — NUR
PRE-ADMISSION NOTE



Client was seen in the intake office @ 1430. Client is a/o to person, place, time, and 
situation. Client presented with sweats, fidgety hands and legs, body aches, and rapid 
speech. Client was assessed and is able to comprehend and understand unit protocol and 
treatment process. Client reports no known allergies and is full code. Client states he 
coming off heroine and xanax. Client denies seizures. Client's v/s T 98.1, P 85, RR 18, SPO2 
98%, /77. Educated client on the admission process and was able to verbalize back good 
understanding. Will continue with admission process when client is on the unit.

## 2018-04-20 NOTE — NUR
START OF SHIFT



Patient is a 27-year-old male admitted today 4/20/18 for Heroin and Xanax withdrawal. 
Patient is scheduled to start a 5-day Subutex taper and 5-day Ativan taper tonight. Patients 
last COWS was 4, last CIWA was 9 per day shift. Patient received PRN Ativan and Robaxin per 
day shift, both noted to be effective. Upon assessment, patient appears anxious and worried. 
Patient is alert and oriented x4, complaining of back pain and left leg aches 6/10 on pain 
scale. Patient is on fall and seizure precautions, with no history of seizure. Safety 
measures in place, side rails up x2, bed locked in low position, call light within reach. 
Will continue to monitor.

## 2018-04-20 NOTE — NUR
PRN MOTRIN REASSESSMENT



Patient reports pain is 3/10 on pain scale. PRN Motrin effective. Safety measures in place, 
bed locked in low position, side rails up x2, call light within reach. Will continue to 
monitor.

## 2018-04-20 NOTE — NUR
END OF SHIFT NOTE



Endorsed patient to oncoming nurse. Patient is in his room. Pt. is a/o to person, place, 
time, and situation. Pt. continues to present with an anxious mood,  depressed and worried 
affect, fidgety, pt. had unkempt hair, and a disheveled room. Pt. was given PRN Vistaril @ 
0877 because pt. was c/o mild anxiety and sweating. Pt. was reassessed an hour later and pt. 
stated s/s of anxiety decreased. Pt.s fluid intake was  2575 ml. Pt voided 4 times and had 1 
BM. Last CIWA 13 @ 1600. Call light within reach. Pt. will continue to be monitored and 
needs met.

-------------------------------------------------------------------------------

Addendum: 04/20/18 at 1900 by FANY COBIAN RN

-------------------------------------------------------------------------------

Error in nurses end of shift note. For wrong patient.

## 2018-04-20 NOTE — NUR
PRN Ativan and Robaxin given. Pt. c/o of body aches and muscle spasms, as well as, 
fidgetiness. Will reassess @ 2975

## 2018-04-20 NOTE — NUR
ADMISSION NOTE



Client is a 26yo male. Client is a/o to person, place, time, and situation. Client is able 
to understand and comprehend what is happening. 



Client presented with sweats, body and bone aches, restless legs, racing thoughts, and rapid 
speech. Client was intoxicated from recent use of Heroine (1g) on morning of admission. 
Client's CIWA 12 and COWS 11 upon admission. 



Client's v/s were T 98.1, P 85, RR 18, SPO2 89%, /77, and Pain 0/10. Client has no 
known allergies. 



Client's LBM 4/19/18 in the late afternoon. Client has no h/o withdrawal induced seizures. 
Client has no medical Hx. Client has no home medications. 



Client has no family Hx of substance abuse. Client currently lives with brother and his 
mother and father in a house he owns. Client is  at a restaurant. Client understands 
he needs Tx for his addiction and that this time will be different because he stated "I will 
die if I continue like this". 



Client's substance abuse Hx:

1. Opiates: Heroine, Smoking, 1.5g per day for the last 4 months.

2. Benzodiazepines: Xanax, PO, 6-8mg per day for the last 4 months.



Client does not have a PCP.



Dr. Hopson assessed the client. Client provided UDS on the unit. All safety measures 
instituted along with universal precautions. Call light was placed within reach of patient. 
Will continue to monitor and attend to the needs of the patient.

## 2018-04-20 NOTE — NUR
END OF SHIFT NOTE



Endorsed patient to oncoming nurse. Pt is in his room. Pt is a/o to person, place, time, and 
situation. Pt presented with sweats, restless leg, as fidgety, racing thoughts, and rapid 
speech. Pt received PRN Ativan and Robaxin @ 1730, Pt was c/o body aches and spasms, and 
restlessness and fidgetiness. Pt was reassessed @ 1830 and symptoms were partially 
alleviated.  Pts fluid intake was 1300 ml. Pt voided 1 time.  Last CIWA  9 @ 1830 and COWS 
11 @ 1600. Call light is within reach and bed is at the low position. Pt will continue t be 
monitored and needs met.

## 2018-04-21 VITALS — DIASTOLIC BLOOD PRESSURE: 70 MMHG | SYSTOLIC BLOOD PRESSURE: 118 MMHG

## 2018-04-21 VITALS — SYSTOLIC BLOOD PRESSURE: 100 MMHG | DIASTOLIC BLOOD PRESSURE: 63 MMHG

## 2018-04-21 VITALS — SYSTOLIC BLOOD PRESSURE: 108 MMHG | DIASTOLIC BLOOD PRESSURE: 60 MMHG

## 2018-04-21 VITALS — DIASTOLIC BLOOD PRESSURE: 62 MMHG | SYSTOLIC BLOOD PRESSURE: 105 MMHG

## 2018-04-21 VITALS — SYSTOLIC BLOOD PRESSURE: 111 MMHG | DIASTOLIC BLOOD PRESSURE: 64 MMHG

## 2018-04-21 VITALS — DIASTOLIC BLOOD PRESSURE: 66 MMHG | SYSTOLIC BLOOD PRESSURE: 123 MMHG

## 2018-04-21 RX ADMIN — GABAPENTIN SCH MG: 300 CAPSULE ORAL at 21:11

## 2018-04-21 RX ADMIN — GABAPENTIN SCH MG: 300 CAPSULE ORAL at 08:34

## 2018-04-21 RX ADMIN — IBUPROFEN PRN MG: 600 TABLET, FILM COATED ORAL at 17:25

## 2018-04-21 RX ADMIN — BUPRENORPHINE HYDROCHLORIDE SCH MG: 2 TABLET SUBLINGUAL at 21:11

## 2018-04-21 RX ADMIN — BUPRENORPHINE HYDROCHLORIDE SCH MG: 2 TABLET SUBLINGUAL at 08:36

## 2018-04-21 RX ADMIN — LORAZEPAM SCH MG: 1 TABLET ORAL at 21:11

## 2018-04-21 RX ADMIN — BUPRENORPHINE HYDROCHLORIDE SCH MG: 2 TABLET SUBLINGUAL at 14:14

## 2018-04-21 RX ADMIN — METHOCARBAMOL TABLETS PRN MG: 750 TABLET, COATED ORAL at 08:34

## 2018-04-21 RX ADMIN — METHOCARBAMOL TABLETS PRN MG: 750 TABLET, COATED ORAL at 17:25

## 2018-04-21 RX ADMIN — HYDROXYZINE PAMOATE PRN MG: 25 CAPSULE ORAL at 21:11

## 2018-04-21 RX ADMIN — LORAZEPAM SCH MG: 1 TABLET ORAL at 14:14

## 2018-04-21 RX ADMIN — LORAZEPAM SCH MG: 1 TABLET ORAL at 08:34

## 2018-04-21 NOTE — NUR
COWS & CIWA DEFERRED



COWS and CIWA deferred again at this time due to patient sleeping; to be assessed and scored 
while patient is awake. Respirations are even and unlabored, 16/min. Safety measures in 
place, call light within reach. Will continue to monitor.

## 2018-04-21 NOTE — NUR
PRN



PT C/O INCREASED ANXIETY 7/10, HR :114, MUSCLE CRAMPS, TWITCHING, RESTLESS LEGS AND 
GENERALIZED BODY ACHES. WILL MONITOR FOR EFFECTIVENESS.

## 2018-04-21 NOTE — NUR
COWS & CIWA DEFERRED



COWS and CIWA deferred due to patient sleeping; to be assessed and scored while patient is 
awake. Respirations are even and unlabored, 18 breaths per min. Safety measures in place, 
call light within reach. Will continue to monitor.

## 2018-04-21 NOTE — NUR
RE-ASSESSMENT PEBBLES 

Pt. is sleeping, RR=16, unlabored and even . Safety measures in place : bed on lowest 
position with side rails x2 up for safety, call light within reach. Will continue  to  
monitor closely and offer help.

## 2018-04-21 NOTE — NUR
END OF SHIFT



Patient is a 27-year-old male admitted yesterday 4/20/18 for Heroin and Xanax withdrawal. 
Patient started a 5-day Subutex taper and 5-day Ativan taper last night, tolerating well. 
Patients last COWS was 8 and last CIWA was 11. Patient received PRN Motrin at 2118, noted to 
be effective upon reassessment. Patient slept for 7 hours, total intake of 797mL, void x1, 
stool x0. Patient is on fall and seizure precautions, with no history of seizure. Safety 
measures in place, side rails up x2, bed locked in low position, call light within reach. 
Will endorse to day shift.

## 2018-04-21 NOTE — NUR
REASSESSMENT



PT APPEARS TO BE SLEEPING IN BED, RESPIRATIONS EVEN AND UNLABORED. RR:18. SAFETY MEASURES IN 
PLACE.

## 2018-04-21 NOTE — NUR
START OF SHIFT



PT IS A 28 Y/O M ADMITTED ON 04/20/18 FOR MEDICALLY SUPERVISED BENZO AND OPIATE 
WITHDRAWAL.PT IS ON A 5 DAY ATIVAN AND SUBUTEX TAPER STARTED YESTERDAY AND TOLERATING WELL. 
LAST COWS 8 AND CIWA 11 @2000. PT PRESENTS PUPIL DILATION, CHILLS, HOT/COLD FLASHES, 
DIAPHORESIS, PILOERECTION OF THE SKIN, NASAL CONGESTION, EYE TEARING, GENERALIZED BODY 
ACHES, BACK PAIN 7/10, RESTLESSNESS, ANXIETY, AGITATION, DECREASED APPETITE, DIFFICULTY 
CONCENTRATING, ABDOMINAL CRAMPS, FACIAL FLUSHING. ENCOURAGED PT TO INCREASE FLUIDS TO 
FACILITATE IN DETOX AND PROMOTE HYDRATION. EDUCATED PT ON TODAY'S MED REGIMEN AND TX PLAN. 
SIDE RAILS X2, BED IN LOW POSITION, CALL LIGHT IS WITHIN REACH. SAFETY MEASURES IN PLACE. 
WILL CONTINUE TO MONITOR.

## 2018-04-21 NOTE — NUR
END OF SHIFT



PT'S LAST COWS 11 AND CIWA 11. PT HAS BEEN ISOLATIVE IN ROOM MOST OF SHIFT. ROBAXIN X2, 
IBUPROFEN, CLONIDINE AND ATIVAN 2 MG PO PRNS WERE GIVEN. PT PRESENTED TACHYCARDIA, ANXIETY, 
RESTLESSNESS, AND GENERALIZED BODY ACHES, TWITCHING, MYALGIA. PT ATE 0/25/0% OF MEALS TODAY. 
FLUID INTAKE: 1300, VOIDED X3, BM 0. WILL GIVE ENDORSEMENT TO NIGHT SHIFT.

-------------------------------------------------------------------------------

Addendum: 04/21/18 at 1850 by GI SUÁREZ RN

-------------------------------------------------------------------------------

LAST COWS 12 AND CIWA 12.

## 2018-04-21 NOTE — NUR
PRN



PT C/O GENERALIZED BODY ACHES, MUSCLE CRAMPS; ROBAXIN 750 MG PO PRN GIVEN. WILL MONITOR FOR 
EFFECTIVENESS.

## 2018-04-21 NOTE — NUR
REASSESSMENT



PT REPORTS CLONIDINE IS EFFECTIVE FOR HIS CHILLS, COLD/HOT FLASHES, AND PILOERECTION OF 
SKIN. WILL CONTINUE TO MONITOR.

## 2018-04-21 NOTE — NUR
START OF SHIFT NOTE :

Patient is a 27-year-old male admitted today 4/20/18 for Heroin and Xanax withdrawal. 
Patient is on fall and seizure precautions, with no history of seizure. Patient is on a 
5-day Subutex taper and 5-day Ativan taper, started on 4/20/2018. Patients last COWS was 12, 
last CIWA was 12 at 16:00. Patient received PRN ROBAXIN X2, IBUPROFEN, CLONIDINE AND ATIVAN 
2 MG PO during a day shift, noted to be effective. Upon assessment, patient is resting in 
the bed, sleeping at this time. Patient is alert and oriented x4 when alert, complained of 
increased level of anxiety, insomnia, restless legs, mild body ache. Safety measures in 
place, side rails up x2, bed locked in low position, call light within reach. Will continue 
to monitor and observe closely.

## 2018-04-21 NOTE — NUR
PRN VISTARIL



Pt. complains of increased level of anxiety. PRN VISTARIL given as ordered. Safety measures 
in place : bed on lowest position with side rails x2 up for safety, call light within reach. 
Will continue  to  monitor closely and offer help.

## 2018-04-22 VITALS — SYSTOLIC BLOOD PRESSURE: 110 MMHG | DIASTOLIC BLOOD PRESSURE: 65 MMHG

## 2018-04-22 VITALS — DIASTOLIC BLOOD PRESSURE: 66 MMHG | SYSTOLIC BLOOD PRESSURE: 120 MMHG

## 2018-04-22 VITALS — SYSTOLIC BLOOD PRESSURE: 118 MMHG | DIASTOLIC BLOOD PRESSURE: 73 MMHG

## 2018-04-22 VITALS — DIASTOLIC BLOOD PRESSURE: 63 MMHG | SYSTOLIC BLOOD PRESSURE: 101 MMHG

## 2018-04-22 PROCEDURE — HZ41ZZZ GROUP COUNSELING FOR SUBSTANCE ABUSE TREATMENT, BEHAVIORAL: ICD-10-PCS

## 2018-04-22 RX ADMIN — GABAPENTIN SCH MG: 300 CAPSULE ORAL at 08:27

## 2018-04-22 RX ADMIN — BUPRENORPHINE HYDROCHLORIDE SCH MG: 2 TABLET SUBLINGUAL at 21:07

## 2018-04-22 RX ADMIN — LORAZEPAM SCH MG: 1 TABLET ORAL at 08:27

## 2018-04-22 RX ADMIN — BUPRENORPHINE HYDROCHLORIDE SCH MG: 2 TABLET SUBLINGUAL at 14:32

## 2018-04-22 RX ADMIN — HYDROXYZINE PAMOATE PRN MG: 25 CAPSULE ORAL at 21:07

## 2018-04-22 RX ADMIN — GABAPENTIN SCH MG: 300 CAPSULE ORAL at 21:07

## 2018-04-22 RX ADMIN — BACLOFEN SCH MG: 10 TABLET ORAL at 21:07

## 2018-04-22 RX ADMIN — CLONIDINE HYDROCHLORIDE SCH MG: 0.1 TABLET ORAL at 21:09

## 2018-04-22 RX ADMIN — LORAZEPAM SCH MG: 1 TABLET ORAL at 12:00

## 2018-04-22 RX ADMIN — GABAPENTIN SCH MG: 300 CAPSULE ORAL at 14:32

## 2018-04-22 NOTE — NUR
START OF SHIFT NOTE :

Patient is a 27-year-old male admitted today 4/20/18 for Heroin and Xanax withdrawal. 
Patient is on fall and seizure precautions, with no history of seizure. Patient is on a 
5-day Subutex taper and 5-day Ativan taper, started on 4/20/2018. Patients last COWS was 10, 
last CIWA was 9 at 16:00. No  PRN was  giving during a day shift. Upon assessment, patient 
is watching TV in the activity room, communicating with other clients. Patient is alert and 
oriented x4, complained of difficulty falling and staying asleep, increased level of 
anxiety, mild body ache. Instructed patient to maintain adequate fluid and nutritional 
intake. Safety measures in place, side rails up x2, bed locked in low position, call light 
within reach. Will continue to monitor and observe closely.

## 2018-04-22 NOTE — NUR
START OF SHIFT

Received report from night nurse, 27 year old male admitted for Heroin and Xanax withdrawal. 
Patient cont with 5 days Subutex and Ativan taper tolerating well. Per endorsement patient 
received PRN Vistaril effective per night nurse and  slept for 7 hours, Last COWS and CIWA 
score was-9. Received patient alert awake anxious agitated, restless, chills, yawning, 
stuffy nose, stomach cramps bilateral hand tremors noted. Patient is due for schedule 
medications. Educated patient with current plan of care of medications regimen and 
importance of attending groups and activities with good verbal understanding. Safety 
measures in place. Will cont with plan of care.

## 2018-04-22 NOTE — NUR
END OF SHIFT NOTE

Gave report to night nurse, 27 year old male patient remains A/O x4. Pt was cont on a 5 day 
Subutex/Ativan taper and is tolerating well. During shift pt did not receive any PRN'S. 
Patient presented with anxiety, agitation, tremors, light headed,nausea, difficulty sitting 
still, yawning. Patient was given scheduled medications. Patient rested in his room most of 
the time. Encourage pt to develop coping skills and utilization of non pharmacological 
intervention. Pt encouraged to attend groups and activities. Pt seen socializing with peers. 
Vital signs WNL. Encourage PO fluids as tolerated. Safety measures in place. Patient 
endorsed to night nurse in stable condition.

## 2018-04-22 NOTE — NUR
END OF SHIFT NOTE :

Patient is a 27-year-old male admitted today 4/20/18 for Heroin and Xanax withdrawal. 
Patient is on fall and seizure precautions, with no history of seizure. Patient is on a 
5-day Subutex taper and 5-day Ativan taper, started on 4/20/2018. PRN VISTARIL  given during 
a night shift. Pt. asked for sleeping pill after 03:30, recommend to use relaxation 
technique. Pt. fell asleep.  Last COWS=9, CIWA=9 recorded at 04:00. Encouraged patient to 
participate in group therapies and verbalize feelings. Intake= 1,451ml, voided x4, slept=7 
hours. Safety measures in place : bed on lowest position with side rails x2 up for safety,  
all light within reach. Will continue  to  monitor closely and offer help.

## 2018-04-23 VITALS — SYSTOLIC BLOOD PRESSURE: 107 MMHG | DIASTOLIC BLOOD PRESSURE: 72 MMHG

## 2018-04-23 VITALS — DIASTOLIC BLOOD PRESSURE: 55 MMHG | SYSTOLIC BLOOD PRESSURE: 107 MMHG

## 2018-04-23 VITALS — SYSTOLIC BLOOD PRESSURE: 104 MMHG | DIASTOLIC BLOOD PRESSURE: 71 MMHG

## 2018-04-23 VITALS — DIASTOLIC BLOOD PRESSURE: 58 MMHG | SYSTOLIC BLOOD PRESSURE: 102 MMHG

## 2018-04-23 RX ADMIN — BUPRENORPHINE HYDROCHLORIDE SCH MG: 2 TABLET SUBLINGUAL at 14:33

## 2018-04-23 RX ADMIN — BACLOFEN SCH MG: 10 TABLET ORAL at 21:43

## 2018-04-23 RX ADMIN — BUPRENORPHINE HYDROCHLORIDE SCH MG: 2 TABLET SUBLINGUAL at 08:36

## 2018-04-23 RX ADMIN — CLONIDINE HYDROCHLORIDE SCH MG: 0.1 TABLET ORAL at 21:42

## 2018-04-23 RX ADMIN — BACLOFEN SCH MG: 10 TABLET ORAL at 14:33

## 2018-04-23 RX ADMIN — BUPRENORPHINE HYDROCHLORIDE SCH MG: 2 TABLET SUBLINGUAL at 21:42

## 2018-04-23 RX ADMIN — BACLOFEN SCH MG: 10 TABLET ORAL at 08:35

## 2018-04-23 RX ADMIN — GABAPENTIN SCH MG: 300 CAPSULE ORAL at 08:35

## 2018-04-23 RX ADMIN — CLONIDINE HYDROCHLORIDE SCH MG: 0.1 TABLET ORAL at 14:35

## 2018-04-23 RX ADMIN — CLONIDINE HYDROCHLORIDE SCH MG: 0.1 TABLET ORAL at 08:38

## 2018-04-23 RX ADMIN — GABAPENTIN SCH MG: 300 CAPSULE ORAL at 14:33

## 2018-04-23 NOTE — NUR
END OF SHIFT NOTE :

Patient is a 27-year-old male admitted on 4/20/18 for Heroin and Xanax withdrawal. Patient 
is on fall and seizure precautions, with no history of seizure. Patient is on a 5-day 
Subutex taper and 5-day Ativan taper, started on 4/20/2018, tolerates well. PRN VISTARIL  
given during a night shift. Pt. was awake until late in the evening, went downstairs to 
smoke x3 after 21:00 .  Last COWS=8, CIWA=8 recorded at 04:00. Intake= 1,333ml, voided x3, 
slept=7 hours. Safety measures in place : bed on lowest position with side rails x2 up for 
safety,  all light within reach. Will continue  to  monitor closely and offer help.

## 2018-04-23 NOTE — NUR
Start of Shift

Pt is a 26 y/o male admitted 04/20/18 for medically managed withdrawal/detox from Heroin and 
Xanax via a 5 day Ativan/Subutex taper. Patient is found in room after group. Room is messy 
and disheveled, clothes on floor and food and empty drink bottles laying about. Pt is 
unshaven and clothes are unkempt, Pt cooperative, but affect sad/depressed and eye contact 
avoidant. Evening meds reviewed with pt, including Ativan and Subutex. Will continue to 
monitor patient, promptly attending to all pt needs.

## 2018-04-23 NOTE — NUR
START OF SHIFT

Received report from night nurse, 27 year old male admitted for Heroin and Xanax withdrawal. 
Patient cont with 5 days Subutex and Ativan taper tolerating well. Per endorsement patient 
received PRN Vistaril effective per night nurse and slept for 7 hours, Last COWS and CIWA 
score was-8. Received patient asleep responsive to verbal and tactile stimuli. Breathing 
normal no SOB noted.  Patient is due for schedule medications. Safety measures in place. 
Will cont with plan of care.

## 2018-04-23 NOTE — NUR
END OF SHIFT NOTE

Patient presented with anxiety, agitation, tremors, nausea, sad, flat effect. Patient 
admitted for Opioid, Benzo withdrawal and cont with Subutex and Ativan taper tolerating 
well. During shift patient did not receive any PRN'S. Patient was seen by psychiatrist with 
new order to give BuSpar 5mg PO medication administered as ordered and patient tolerated 
well. Encourage patient to attend groups and activities to learn new coping skills. patient 
verbalized understanding. Patient noted with attending groups expect morning group. Vital 
signs WNL. All safety measures in place. Patient endorsed to night nurse in stable 
condition.

## 2018-04-24 VITALS — DIASTOLIC BLOOD PRESSURE: 75 MMHG | SYSTOLIC BLOOD PRESSURE: 105 MMHG

## 2018-04-24 VITALS — DIASTOLIC BLOOD PRESSURE: 58 MMHG | SYSTOLIC BLOOD PRESSURE: 100 MMHG

## 2018-04-24 VITALS — SYSTOLIC BLOOD PRESSURE: 107 MMHG | DIASTOLIC BLOOD PRESSURE: 76 MMHG

## 2018-04-24 VITALS — SYSTOLIC BLOOD PRESSURE: 118 MMHG | DIASTOLIC BLOOD PRESSURE: 71 MMHG

## 2018-04-24 RX ADMIN — CLONIDINE HYDROCHLORIDE SCH MG: 0.1 TABLET ORAL at 20:38

## 2018-04-24 RX ADMIN — IBUPROFEN PRN MG: 600 TABLET, FILM COATED ORAL at 19:21

## 2018-04-24 RX ADMIN — LORAZEPAM SCH MG: 1 TABLET ORAL at 08:51

## 2018-04-24 RX ADMIN — DICYCLOMINE HYDROCHLORIDE SCH MG: 20 TABLET ORAL at 20:36

## 2018-04-24 RX ADMIN — GABAPENTIN SCH MG: 300 CAPSULE ORAL at 20:37

## 2018-04-24 RX ADMIN — DICYCLOMINE HYDROCHLORIDE SCH MG: 20 TABLET ORAL at 14:37

## 2018-04-24 RX ADMIN — BACLOFEN SCH MG: 20 TABLET ORAL at 20:35

## 2018-04-24 RX ADMIN — GABAPENTIN SCH MG: 300 CAPSULE ORAL at 14:38

## 2018-04-24 RX ADMIN — BUPRENORPHINE HYDROCHLORIDE SCH MG: 2 TABLET SUBLINGUAL at 20:43

## 2018-04-24 RX ADMIN — LORAZEPAM SCH MG: 1 TABLET ORAL at 20:36

## 2018-04-24 RX ADMIN — BACLOFEN SCH MG: 10 TABLET ORAL at 08:51

## 2018-04-24 RX ADMIN — CLONIDINE HYDROCHLORIDE SCH MG: 0.1 TABLET ORAL at 08:52

## 2018-04-24 RX ADMIN — GABAPENTIN SCH MG: 300 CAPSULE ORAL at 08:51

## 2018-04-24 RX ADMIN — LORAZEPAM SCH MG: 1 TABLET ORAL at 14:38

## 2018-04-24 RX ADMIN — CLONIDINE HYDROCHLORIDE SCH MG: 0.1 TABLET ORAL at 14:39

## 2018-04-24 RX ADMIN — BACLOFEN SCH MG: 20 TABLET ORAL at 14:37

## 2018-04-24 RX ADMIN — BUPRENORPHINE HYDROCHLORIDE SCH MG: 2 TABLET SUBLINGUAL at 08:52

## 2018-04-24 NOTE — NUR
PRN Reassessment

Motrin 600mg PO given 1 hour prior for H/A, pain 8/10. At present pt reports H/A improved to 
2/10. Med effective. Will continue to monitor and promptly attend to all pt needs.

## 2018-04-24 NOTE — NUR
End of Shift

Pt is a 26 y/o male admitted 04/20/18 for medically managed withdrawal/detox from Heroin and 
Xanax via a 5 day Ativan/Subutex taper. No PRN meds for shift. Pt slept for 5 hours, with 
1355 mls intake, 3 voids and 1 BM's. Will continue to monitor patient, promptly attending to 
all pt needs.

## 2018-04-24 NOTE — NUR
Midnight VS's, COWS and CIWA Deferred.

0000 VS's, CIWA and COWS deferred r/t pt sleeping/refused. RR 14, even and nonlabored. Will 
continue to monitor and promptly attend to all patient needs.

## 2018-04-24 NOTE — NUR
PRN Med

Motrin 600mg PO given for H/A pain 8/10. Will continue to monitor, reassessing in 1 hour, 
and promptly attending to all pt needs.

## 2018-04-24 NOTE — NUR
START OF SHIFT

Received report from night nurse, 27 year old male admitted for Heroin and Xanax withdrawal. 
Patient cont with 5 days Subutex and Ativan taper tolerating well. Per endorsement patient 
did not receive any and slept for 5 hours, Last COWS -9 and CIWA score was-11. Received 
patient asleep responsive to verbal and tactile stimuli. Breathing normal no SOB noted.  
Patient is due for schedule medications. Safety measures in place. Will cont with plan of 
care.

## 2018-04-24 NOTE — NUR
END OF SHIFT NOTE

Patient presented with anxiety, agitation, tremors, nausea, tremors. Patient admitted for 
Opioid, Benzo withdrawal and cont with Subutex and Ativan taper tolerating well. During 
shift patient did not receive any PRN'S. Skin intact warm and dry to touch. Encourage 
patient to attend groups and activities to learn new coping skills. patient verbalized 
understanding. Patient noted with attending groups expect morning group. Patient able to 
consumed 100% of his meals, encourage PO fluids as ordered. Vital signs WNL. All safety 
measures in place. Patient endorsed to night nurse in stable condition.

## 2018-04-24 NOTE — NUR
Start of Shift

Pt is a 28 y/o male admitted 04/20/18 for medically managed withdrawal/detox from Heroin and 
Xanax. Pt first encountered at nurses station with c/o H/A (8/10). Motrin 600mg PO given in 
pt room. Pt in bed in supine position, HOB 30 degrees.  No requests or other c/os at this 
time. Evening meds reviewed with pt. Will continue to monitor and promptly attend to all pt 
needs.

## 2018-04-24 NOTE — NUR
VS's, COWS and CIWA Deferred.

0400 VS's, CIWA and COWS deferred r/t pt sleeping/refused. RR 14, even and nonlabored. Will 
continue to monitor and promptly attend to all patient needs.

## 2018-04-25 VITALS — SYSTOLIC BLOOD PRESSURE: 117 MMHG | DIASTOLIC BLOOD PRESSURE: 76 MMHG

## 2018-04-25 VITALS — SYSTOLIC BLOOD PRESSURE: 96 MMHG | DIASTOLIC BLOOD PRESSURE: 62 MMHG

## 2018-04-25 VITALS — DIASTOLIC BLOOD PRESSURE: 70 MMHG | SYSTOLIC BLOOD PRESSURE: 118 MMHG

## 2018-04-25 VITALS — SYSTOLIC BLOOD PRESSURE: 112 MMHG | DIASTOLIC BLOOD PRESSURE: 67 MMHG

## 2018-04-25 RX ADMIN — BACLOFEN SCH MG: 20 TABLET ORAL at 08:55

## 2018-04-25 RX ADMIN — DICYCLOMINE HYDROCHLORIDE SCH MG: 20 TABLET ORAL at 14:10

## 2018-04-25 RX ADMIN — CLONIDINE HYDROCHLORIDE SCH MG: 0.1 TABLET ORAL at 20:26

## 2018-04-25 RX ADMIN — GABAPENTIN SCH MG: 300 CAPSULE ORAL at 08:55

## 2018-04-25 RX ADMIN — CLONIDINE HYDROCHLORIDE SCH MG: 0.1 TABLET ORAL at 08:56

## 2018-04-25 RX ADMIN — DICYCLOMINE HYDROCHLORIDE SCH MG: 20 TABLET ORAL at 08:55

## 2018-04-25 RX ADMIN — GABAPENTIN SCH MG: 300 CAPSULE ORAL at 14:10

## 2018-04-25 RX ADMIN — BACLOFEN SCH MG: 20 TABLET ORAL at 20:26

## 2018-04-25 RX ADMIN — BACLOFEN SCH MG: 20 TABLET ORAL at 14:15

## 2018-04-25 RX ADMIN — GABAPENTIN SCH MG: 300 CAPSULE ORAL at 20:26

## 2018-04-25 RX ADMIN — CLONIDINE HYDROCHLORIDE SCH MG: 0.1 TABLET ORAL at 14:11

## 2018-04-25 RX ADMIN — DICYCLOMINE HYDROCHLORIDE SCH MG: 20 TABLET ORAL at 20:26

## 2018-04-25 NOTE — NUR
END OF SHIFT NOTE

Patient presented with anxiety, agitation, stomach cramps,. Patient admitted for Heroin/ 
Benzo withdrawal and completed his Subutex and Ativan taper tolerated well. During shift 
patient received x1 dose of Toradol for lower back pain noted to be effective. Patient set 
for discharge in am. Patient noted with attending groups expect morning group. Vital signs 
WNL. All safety measures in place. Patient endorsed to night nurse in stable condition.

## 2018-04-25 NOTE — NUR
End of Shift

Pt is a 26 y/o male admitted 04/20/18 for medically managed withdrawal/detox from Heroin and 
Xanax. PRN meds for shift were Motrin 600mg PO at 1921. Pt slept for 6 hours, with 796 
intake, 1 void and 1 BM. 2000 COWS/10 CIWA/12, /71, . Will continue to monitor 
and promptly attend to all pt needs.

## 2018-04-25 NOTE — NUR
VS's COWS Deferred

0400 VS's and COWS deferred r/t pt sleeping/refused. RR 14, even and nonlabored. Will 
continue to monitor and promptly attend to all pt needs

## 2018-04-25 NOTE — NUR
VS's COWS Deferred

Midnight VS's and COWS deferred r/t pt sleeping/refused. RR 14, even and nonlabored. Will 
continue to monitor and promptly attend to all pt needs

## 2018-04-25 NOTE — NUR
START OF SHIFT

Received report from night nurse, 27 year old male admitted for Heroin and Xanax withdrawal. 
Patient cont with 5 days Subutex and Ativan taper tolerating well. Per endorsement patient 
received PRN Motrin effective per night nurse and  slept for 6 hours, Last COWS-10 and CIWA 
score was-12. Received patient alert awake anxious agitated, restless, chills, yawning, 
stuffy nose, bilateral hand tremors noted. Patient is due for schedule medications. Educated 
patient with current plan of care of medications regimen and importance of attending groups 
and activities with good verbal understanding. Safety measures in place. Will cont with plan 
of care.

## 2018-04-25 NOTE — NUR
START OF SHIFT

Endorsed  patient is a 27 year old male completed 5 day Subutex and PRN Ativan taper for 
Benzodiazepines and Opioid withdrawal.  Patient is alert and oriented x4.  He  is lying in 
the bed, and watching TV.   Patient appears anxious, agitated, easily overwhelmed, worry, 
and sad.   Patient reports  increased anxiety, restlessness,stomach cramps, generalized body 
aches, irritability, and fatigue.   Patient noted disheveled, unkempt, and uncombed.  
Educated in safety and hygiene care.  Encouraged to independently perform hygiene care.   
Patient verbalized understanding.  Encouraged  to attend group therapy.  Encouraged to 
increase fluids as tolerate.   Latest  COWS=4,CIWA= 3 @1600.  Patient presents with 
withdrawal symptoms such as anxiety, agitation, nervousness,  abdominal cramps, headache, 
myalgia, sweating, tremors, body aches, and restlessness.  PRN Toradol administrated for low 
back pain was effective per day shift nurse report.  Patient scheduled for discharging 
tomorrow, on 04/26/2018 @0930.  Safe and calm environment with minimized noises was 
provided.   Encouraged to fluid intake as tolerated.  Encouraged  to attended groups 
activities.  All needs met.  Safety measures in the place: Call light within reach, bed in 
the lowest position locked, padded rails up x2.  Patient endorsed by day shift nurse.

## 2018-04-26 VITALS — SYSTOLIC BLOOD PRESSURE: 100 MMHG | DIASTOLIC BLOOD PRESSURE: 57 MMHG

## 2018-04-26 VITALS — SYSTOLIC BLOOD PRESSURE: 105 MMHG | DIASTOLIC BLOOD PRESSURE: 67 MMHG

## 2018-04-26 VITALS — DIASTOLIC BLOOD PRESSURE: 54 MMHG | SYSTOLIC BLOOD PRESSURE: 102 MMHG

## 2018-04-26 RX ADMIN — DICYCLOMINE HYDROCHLORIDE SCH MG: 20 TABLET ORAL at 08:08

## 2018-04-26 RX ADMIN — BACLOFEN SCH MG: 20 TABLET ORAL at 08:08

## 2018-04-26 RX ADMIN — CLONIDINE HYDROCHLORIDE SCH MG: 0.1 TABLET ORAL at 08:11

## 2018-04-26 RX ADMIN — GABAPENTIN SCH MG: 300 CAPSULE ORAL at 08:11

## 2018-04-26 NOTE — NUR
END OF SHIFT NOTE:

Presented  patient is a 27 year male completed Ativan and Subutex taper ordered for 
Benzodiazepines/Xanax, and Opioid/Heroin withdrawal.  Withdrawal symptoms was closely 
monitored.   Patient is alert and oriented x4. Encouraged expressed his feelings.   Initial 
COWS=9, CIWA=8 at 2000,  COWS=6, CIWA=5 at 0000.  The most recent  COWS=6, CIWA=6 @0400.  
During  my shift patient presented with anxiety, agitation, nervousness, nasal congestion, 
tremors, sweating, restless legs, and  fatigue.  Skin is intact, warm and dry to touch.  No 
PRN Medications administrated during my night shift.  Patient remains compliant with 
treatment, medications and diet regime.  Encouraged to increase oral fluids as tolerated.  
Patient scheduled for discharging today, on 04/26/2018 at 0930.  Calm and safety environment 
with minimized noises was provided.  Patient slept 6 hours, intake 1,643 ml, voided x3, 
stool x1.  Encouraged to fluid intake as tolerated.  All needs met.  Safety measures in the 
place: Call light within reach,  bed in the lowest position and locked, padded rails up x2.  
Patient endorsed to day shift nurse.

## 2018-04-26 NOTE — NUR
START OF SHIFT

Rcvd endorse from ongoing nurse, client is in bed, he is a/o x 4, he presents with anxious 
mood, flat affect. Client reports feeling anxious due to his discharge this morning to [facility] 
[facility] to continue with his treatment. Discuss discharge instructions with client, 
he verbalized understanding. Client completed 5 day Ativan /5 day Subutex taper. Client was 
admitted for supervised withdrawal from alprazolam and heroin. Last CIWA 6/COWS 6 @ 0400. 
Client had an uneventful night, he slept 6 hrs.. Call light within reach.

## 2018-04-26 NOTE — NUR
Client discharged in stable condition with all valuable, belongings and prescriptions.  
Client denies SI/HI.  To [facility] via private car.